# Patient Record
Sex: FEMALE | Race: OTHER | RURAL
[De-identification: names, ages, dates, MRNs, and addresses within clinical notes are randomized per-mention and may not be internally consistent; named-entity substitution may affect disease eponyms.]

---

## 2018-12-19 NOTE — PATIENT DISCUSSION
"""S/P IOL OD: Teclulu [id number] 18.5 (ORA) @ 095Âº (Target: Near) +ORA/Femto +TM/Omidria. Continue post operative instructions and drops per schedule.  """

## 2019-01-02 NOTE — PATIENT DISCUSSION
"""S/P IOL OS: Tecnis [id number] 14.0 @ 092Âº (Target: Distance) +Femto +TM/Omidria. Continue post operative instructions and drops per schedule.  """

## 2019-02-04 NOTE — PATIENT DISCUSSION
"""S/P IOL OU: OD: Tecnis [id number] 18.5 (ORA) @ 095Âº (Target: Near) +ORAFemto +TMOmidria. OS: Tecnis [id number] 14.0 @ 092Âº (Target: Distance) +TM. MRx given today. "

## 2020-12-10 NOTE — PATIENT DISCUSSION
DISCUSSED PANOPTIX VS VIVITY (PATIENT WILL BE SET FOR PLANO OU DUE TO IOL POWER) PATIENT UNDERSTANDS THAT SHE WILL NEED READING GLASSES AFTER SURGERY.

## 2022-06-29 ENCOUNTER — COMPREHENSIVE EXAM (OUTPATIENT)
Dept: RURAL CLINIC 3 | Facility: CLINIC | Age: 72
End: 2022-06-29

## 2022-06-29 DIAGNOSIS — H52.4: ICD-10-CM

## 2022-06-29 PROCEDURE — 92015 DETERMINE REFRACTIVE STATE: CPT

## 2022-06-29 PROCEDURE — 92014 COMPRE OPH EXAM EST PT 1/>: CPT

## 2022-06-29 ASSESSMENT — TONOMETRY
OD_IOP_MMHG: 14
OS_IOP_MMHG: 14

## 2022-06-29 ASSESSMENT — VISUAL ACUITY
OS_SC: 20/25
OD_SC: 20/25-1

## 2022-09-15 ENCOUNTER — FOLLOW UP (OUTPATIENT)
Dept: RURAL CLINIC 3 | Facility: CLINIC | Age: 72
End: 2022-09-15

## 2022-09-15 DIAGNOSIS — H25.13: ICD-10-CM

## 2022-09-15 DIAGNOSIS — H04.123: ICD-10-CM

## 2022-09-15 PROCEDURE — 99213 OFFICE O/P EST LOW 20 MIN: CPT

## 2022-09-15 ASSESSMENT — TONOMETRY
OD_IOP_MMHG: 16
OS_IOP_MMHG: 16

## 2022-09-15 ASSESSMENT — VISUAL ACUITY
OD_CC: 20/40
OS_CC: 20/50

## 2022-10-18 ENCOUNTER — EMERGENCY VISIT (OUTPATIENT)
Dept: RURAL CLINIC 3 | Facility: CLINIC | Age: 72
End: 2022-10-18

## 2022-10-18 DIAGNOSIS — H10.423: ICD-10-CM

## 2022-10-18 DIAGNOSIS — H04.123: ICD-10-CM

## 2022-10-18 PROCEDURE — 99213 OFFICE O/P EST LOW 20 MIN: CPT

## 2022-10-18 ASSESSMENT — TONOMETRY
OS_IOP_MMHG: 24
OD_IOP_MMHG: 17

## 2022-10-18 ASSESSMENT — VISUAL ACUITY
OS_CC: 20/80
OD_CC: 20/30
OS_PH: 20/60

## 2022-12-19 ENCOUNTER — CONSULTATION/EVALUATION (OUTPATIENT)
Dept: RURAL CLINIC 3 | Facility: CLINIC | Age: 72
End: 2022-12-19

## 2022-12-19 PROCEDURE — 99214 OFFICE O/P EST MOD 30 MIN: CPT

## 2022-12-19 ASSESSMENT — VISUAL ACUITY
OS_SC: 20/90
OD_BAT: 20/150
OD_CC: 20/40
OS_AM: 20/20
OS_BAT: 20/150
OD_PAM: 20/25
OS_CC: 20/30
OS_CC: 20/40
OD_CC: 20/50
OD_SC: 20/90

## 2022-12-19 ASSESSMENT — TONOMETRY
OD_IOP_MMHG: 14
OS_IOP_MMHG: 14

## 2023-01-26 ASSESSMENT — VISUAL ACUITY
OS_CC: 20/40
OD_CC: 20/50
OS_AM: 20/20
OD_BAT: 20/150
OD_PAM: 20/25
OD_SC: 20/90
OS_BAT: 20/150
OD_CC: 20/40
OS_SC: 20/90
OS_CC: 20/30

## 2023-01-27 ENCOUNTER — PRE-OP/H&P (OUTPATIENT)
Dept: RURAL CLINIC 3 | Facility: CLINIC | Age: 73
End: 2023-01-27

## 2023-01-27 VITALS
HEART RATE: 81 BPM | SYSTOLIC BLOOD PRESSURE: 134 MMHG | HEIGHT: 63 IN | DIASTOLIC BLOOD PRESSURE: 74 MMHG | WEIGHT: 111 LBS | BODY MASS INDEX: 19.67 KG/M2

## 2023-01-27 DIAGNOSIS — Z01.818: ICD-10-CM

## 2023-01-27 PROCEDURE — 99499 UNLISTED E&M SERVICE: CPT

## 2023-04-03 ENCOUNTER — PRE-OP/H&P (OUTPATIENT)
Dept: RURAL CLINIC 3 | Facility: CLINIC | Age: 73
End: 2023-04-03

## 2023-04-03 VITALS
SYSTOLIC BLOOD PRESSURE: 176 MMHG | BODY MASS INDEX: 19.67 KG/M2 | HEIGHT: 63 IN | HEART RATE: 83 BPM | DIASTOLIC BLOOD PRESSURE: 75 MMHG | WEIGHT: 111 LBS

## 2023-04-03 DIAGNOSIS — H25.13: ICD-10-CM

## 2023-04-03 DIAGNOSIS — Z01.818: ICD-10-CM

## 2023-04-03 PROCEDURE — 99499 UNLISTED E&M SERVICE: CPT

## 2023-04-03 ASSESSMENT — VISUAL ACUITY
OD_SC: 20/90
OS_AM: 20/20
OD_CC: 20/50
OD_CC: 20/40
OS_CC: 20/40
OS_SC: 20/90
OD_PAM: 20/25
OS_CC: 20/30
OS_BAT: 20/150
OD_BAT: 20/150

## 2023-04-20 ENCOUNTER — SURGERY/PROCEDURE (OUTPATIENT)
Dept: RURAL CLINIC 3 | Facility: CLINIC | Age: 73
End: 2023-04-20

## 2023-04-20 ENCOUNTER — POST OP/EVAL OF SECOND EYE (OUTPATIENT)
Dept: RURAL CLINIC 3 | Facility: CLINIC | Age: 73
End: 2023-04-20

## 2023-04-20 DIAGNOSIS — H25.12: ICD-10-CM

## 2023-04-20 DIAGNOSIS — H25.11: ICD-10-CM

## 2023-04-20 PROCEDURE — 66984 XCAPSL CTRC RMVL W/O ECP: CPT

## 2023-04-20 PROCEDURE — 99213 OFFICE O/P EST LOW 20 MIN: CPT

## 2023-04-20 PROCEDURE — 68841 INSJ RX ELUT IMPLT LAC CANAL: CPT

## 2023-04-20 ASSESSMENT — VISUAL ACUITY
OD_PAM: 20/25
OD_SC: 20/90
OS_SC: 20/300
OD_CC: 20/50
OD_BAT: 20/150
OD_CC: 20/40
OS_PH: 20/150

## 2023-04-20 ASSESSMENT — TONOMETRY
OS_IOP_MMHG: 17
OD_IOP_MMHG: 17

## 2023-04-26 ENCOUNTER — POST-OP (OUTPATIENT)
Dept: RURAL CLINIC 3 | Facility: CLINIC | Age: 73
End: 2023-04-26

## 2023-04-26 DIAGNOSIS — Z98.42: ICD-10-CM

## 2023-04-26 PROCEDURE — 99024 POSTOP FOLLOW-UP VISIT: CPT

## 2023-04-26 ASSESSMENT — TONOMETRY
OS_IOP_MMHG: 12
OD_IOP_MMHG: 12

## 2023-04-26 ASSESSMENT — VISUAL ACUITY
OS_PH: 20/40
OD_PH: 20/40
OD_SC: 20/70

## 2023-05-04 ENCOUNTER — SURGERY/PROCEDURE (OUTPATIENT)
Dept: RURAL CLINIC 3 | Facility: CLINIC | Age: 73
End: 2023-05-04

## 2023-05-04 ENCOUNTER — POST-OP (OUTPATIENT)
Dept: RURAL CLINIC 3 | Facility: CLINIC | Age: 73
End: 2023-05-04

## 2023-05-04 DIAGNOSIS — H25.11: ICD-10-CM

## 2023-05-04 DIAGNOSIS — Z98.42: ICD-10-CM

## 2023-05-04 DIAGNOSIS — Z98.41: ICD-10-CM

## 2023-05-04 PROCEDURE — 68841 INSJ RX ELUT IMPLT LAC CANAL: CPT

## 2023-05-04 PROCEDURE — 66984 XCAPSL CTRC RMVL W/O ECP: CPT

## 2023-05-04 PROCEDURE — 99024 POSTOP FOLLOW-UP VISIT: CPT

## 2023-05-04 ASSESSMENT — VISUAL ACUITY
OD_SC: 20/70
OS_SC: 20/50

## 2023-05-04 ASSESSMENT — TONOMETRY
OD_IOP_MMHG: 15
OS_IOP_MMHG: 15

## 2023-05-17 ENCOUNTER — POST-OP (OUTPATIENT)
Dept: RURAL CLINIC 3 | Facility: CLINIC | Age: 73
End: 2023-05-17

## 2023-05-17 DIAGNOSIS — Z98.42: ICD-10-CM

## 2023-05-17 DIAGNOSIS — Z98.41: ICD-10-CM

## 2023-05-17 PROCEDURE — 99024 POSTOP FOLLOW-UP VISIT: CPT

## 2023-05-17 ASSESSMENT — VISUAL ACUITY
OS_SC: 20/40
OD_SC: 20/40

## 2023-05-17 ASSESSMENT — TONOMETRY
OD_IOP_MMHG: 17
OS_IOP_MMHG: 17

## 2023-06-21 ENCOUNTER — POST-OP (OUTPATIENT)
Dept: RURAL CLINIC 3 | Facility: CLINIC | Age: 73
End: 2023-06-21

## 2023-06-21 DIAGNOSIS — H10.13: ICD-10-CM

## 2023-06-21 PROCEDURE — 99213 OFFICE O/P EST LOW 20 MIN: CPT

## 2023-06-21 ASSESSMENT — TONOMETRY
OD_IOP_MMHG: 14
OS_IOP_MMHG: 14

## 2023-06-21 ASSESSMENT — VISUAL ACUITY
OD_SC: 20/25
OS_SC: 20/40

## 2023-06-27 ENCOUNTER — POST-OP (OUTPATIENT)
Dept: RURAL CLINIC 3 | Facility: CLINIC | Age: 73
End: 2023-06-27

## 2023-06-27 DIAGNOSIS — Z98.42: ICD-10-CM

## 2023-06-27 DIAGNOSIS — Z98.41: ICD-10-CM

## 2023-06-27 DIAGNOSIS — H52.4: ICD-10-CM

## 2023-06-27 PROCEDURE — 92015 DETERMINE REFRACTIVE STATE: CPT

## 2023-06-27 PROCEDURE — 99024 POSTOP FOLLOW-UP VISIT: CPT

## 2023-06-27 ASSESSMENT — TONOMETRY
OS_IOP_MMHG: 15
OD_IOP_MMHG: 15

## 2023-06-27 ASSESSMENT — VISUAL ACUITY
OS_SC: 20/30
OD_SC: 20/25

## 2023-11-15 ENCOUNTER — FOLLOW UP (OUTPATIENT)
Dept: RURAL CLINIC 3 | Facility: CLINIC | Age: 73
End: 2023-11-15

## 2023-11-15 DIAGNOSIS — E11.9: ICD-10-CM

## 2023-11-15 DIAGNOSIS — Z98.41: ICD-10-CM

## 2023-11-15 DIAGNOSIS — Z98.42: ICD-10-CM

## 2023-11-15 PROCEDURE — 99213 OFFICE O/P EST LOW 20 MIN: CPT

## 2023-11-15 ASSESSMENT — VISUAL ACUITY
OD_SC: 20/40
OS_SC: 20/30-2

## 2023-11-15 ASSESSMENT — TONOMETRY
OD_IOP_MMHG: 17
OS_IOP_MMHG: 17